# Patient Record
Sex: MALE | URBAN - METROPOLITAN AREA
[De-identification: names, ages, dates, MRNs, and addresses within clinical notes are randomized per-mention and may not be internally consistent; named-entity substitution may affect disease eponyms.]

---

## 2023-01-06 ENCOUNTER — APPOINTMENT (RX ONLY)
Dept: URBAN - METROPOLITAN AREA CLINIC 4 | Facility: CLINIC | Age: 38
Setting detail: DERMATOLOGY
End: 2023-01-06

## 2023-01-06 DIAGNOSIS — L40.0 PSORIASIS VULGARIS: ICD-10-CM

## 2023-01-06 DIAGNOSIS — L85.3 XEROSIS CUTIS: ICD-10-CM

## 2023-01-06 PROBLEM — L30.9 DERMATITIS, UNSPECIFIED: Status: ACTIVE | Noted: 2023-01-06

## 2023-01-06 PROCEDURE — ? COUNSELING

## 2023-01-06 PROCEDURE — 11102 TANGNTL BX SKIN SINGLE LES: CPT

## 2023-01-06 PROCEDURE — 96372 THER/PROPH/DIAG INJ SC/IM: CPT | Mod: 59

## 2023-01-06 PROCEDURE — ? ADDITIONAL NOTES

## 2023-01-06 PROCEDURE — ? TALTZ INJECTION

## 2023-01-06 PROCEDURE — 99204 OFFICE O/P NEW MOD 45 MIN: CPT | Mod: 25

## 2023-01-06 PROCEDURE — 11103 TANGNTL BX SKIN EA SEP/ADDL: CPT

## 2023-01-06 PROCEDURE — ? BIOPSY BY SHAVE METHOD

## 2023-01-06 PROCEDURE — ? PHOTO-DOCUMENTATION

## 2023-01-06 PROCEDURE — ? ORDER TESTS

## 2023-01-06 PROCEDURE — ? PRESCRIPTION

## 2023-01-06 PROCEDURE — ? TREATMENT REGIMEN

## 2023-01-06 RX ORDER — CLOBETASOL PROPIONATE 0.5 MG/G
1 CREAM TOPICAL BID
Qty: 60 | Refills: 2 | Status: ERX | COMMUNITY
Start: 2023-01-06

## 2023-01-06 RX ADMIN — CLOBETASOL PROPIONATE 1: 0.5 CREAM TOPICAL at 00:00

## 2023-01-06 ASSESSMENT — BSA PSORIASIS: % BODY COVERED IN PSORIASIS: 50

## 2023-01-06 ASSESSMENT — LOCATION SIMPLE DESCRIPTION DERM
LOCATION SIMPLE: RIGHT FOREARM
LOCATION SIMPLE: LEFT FOREARM
LOCATION SIMPLE: RIGHT UPPER BACK
LOCATION SIMPLE: ABDOMEN

## 2023-01-06 ASSESSMENT — LOCATION DETAILED DESCRIPTION DERM
LOCATION DETAILED: PERIUMBILICAL SKIN
LOCATION DETAILED: LEFT PROXIMAL DORSAL FOREARM
LOCATION DETAILED: RIGHT INFERIOR UPPER BACK
LOCATION DETAILED: RIGHT PROXIMAL DORSAL FOREARM

## 2023-01-06 ASSESSMENT — LOCATION ZONE DERM
LOCATION ZONE: ARM
LOCATION ZONE: TRUNK

## 2023-01-06 NOTE — PROCEDURE: ADDITIONAL NOTES
Detail Level: Detailed
Additional Notes: Dr. Clifford has seen the patient and agrees with assessment and plan.
Render Risk Assessment In Note?: no

## 2023-01-06 NOTE — PROCEDURE: TALTZ INJECTION
72874 Billing Preferences: 1
Expiration Date (Optional): 2024-APR-17, 2024-JUL-20
Taltz Amount: 160 mg
Was The Medication Purchased By The Clinic?: No
Ndc (80 Mg/Ml Syringe): [address]-25
Render If Medication Purchased By Clinic In Visit Note?: Yes
Detail Level: None
Consent: The risks of pain and injection site reactions were reviewed with the patient prior to the injection.
Ndc (80 Mg/Ml Pen): [address]-66
Syringe Size Used (Required For Enhanced Ndc): 80 mg/ml Prefilled Pen
J-Code: 
Administered By (Optional): Anna Quiñones
Lot # (Optional): [id number], [id number]

## 2023-01-06 NOTE — PROCEDURE: TREATMENT REGIMEN
Action 2: Continue
Initiate Regimen: Triamcinolone cream bid 2 weeks / month throughout the body\\nClobetasol ointment bid 2 weeks / month on flares elbows & back
Detail Level: Zone
Plan: Patient mentions rash flared around the 1st of November. Patient mentions he does have some joint pain in his ankles. Patient has been using antihistamines, Triamcinolone, calcipotriene, and hydroxyzine with no improvement. Recommended UV therapy or Skyrizi / Taltz , will start patient on UV light therapy and send patient to Dr. Clifford to start process of Mimi.

## 2023-01-31 ENCOUNTER — APPOINTMENT (RX ONLY)
Dept: URBAN - METROPOLITAN AREA CLINIC 6 | Facility: CLINIC | Age: 38
Setting detail: DERMATOLOGY
End: 2023-01-31

## 2023-01-31 DIAGNOSIS — L40.0 PSORIASIS VULGARIS: ICD-10-CM

## 2023-01-31 PROCEDURE — ? TALTZ INJECTION

## 2023-01-31 PROCEDURE — 96372 THER/PROPH/DIAG INJ SC/IM: CPT

## 2023-01-31 ASSESSMENT — LOCATION SIMPLE DESCRIPTION DERM: LOCATION SIMPLE: ABDOMEN

## 2023-01-31 ASSESSMENT — LOCATION ZONE DERM: LOCATION ZONE: TRUNK

## 2023-01-31 ASSESSMENT — LOCATION DETAILED DESCRIPTION DERM: LOCATION DETAILED: PERIUMBILICAL SKIN

## 2023-01-31 NOTE — PROCEDURE: TALTZ INJECTION
29359 Billing Preferences: 1
Expiration Date (Optional): 2024-MAR-07
Taltz Amount: 160 mg
Was The Medication Purchased By The Clinic?: No
Treatment Number (Optional): 2
Ndc (80 Mg/Ml Syringe): [address]-84
Render If Medication Purchased By Clinic In Visit Note?: Yes
Detail Level: None
Consent: The risks of pain and injection site reactions were reviewed with the patient prior to the injection.
Ndc (80 Mg/Ml Pen): [address]-88
Syringe Size Used (Required For Enhanced Ndc): 80 mg/ml Prefilled Pen
J-Code: 
Administered By (Optional): Noah SAEZ MA
Lot # (Optional): [id number]

## 2023-02-14 ENCOUNTER — APPOINTMENT (RX ONLY)
Dept: URBAN - METROPOLITAN AREA CLINIC 4 | Facility: CLINIC | Age: 38
Setting detail: DERMATOLOGY
End: 2023-02-14

## 2023-02-14 DIAGNOSIS — L40.0 PSORIASIS VULGARIS: ICD-10-CM | Status: IMPROVED

## 2023-02-14 PROCEDURE — ? PRESCRIPTION

## 2023-02-14 PROCEDURE — ? SEPARATE AND IDENTIFIABLE DOCUMENTATION

## 2023-02-14 PROCEDURE — ? COUNSELING

## 2023-02-14 PROCEDURE — 96372 THER/PROPH/DIAG INJ SC/IM: CPT

## 2023-02-14 PROCEDURE — ? TALTZ INJECTION

## 2023-02-14 PROCEDURE — 99214 OFFICE O/P EST MOD 30 MIN: CPT | Mod: 25

## 2023-02-14 RX ORDER — TRIAMCINOLONE ACETONIDE 1 MG/G
1 CREAM TOPICAL BID PRN
Qty: 453.6 | Refills: 2 | Status: ERX

## 2023-02-14 ASSESSMENT — LOCATION DETAILED DESCRIPTION DERM
LOCATION DETAILED: RIGHT ELBOW
LOCATION DETAILED: LEFT PROXIMAL DORSAL FOREARM
LOCATION DETAILED: PERIUMBILICAL SKIN
LOCATION DETAILED: LEFT ELBOW
LOCATION DETAILED: RIGHT PROXIMAL PRETIBIAL REGION
LOCATION DETAILED: RIGHT PROXIMAL DORSAL FOREARM
LOCATION DETAILED: LEFT PROXIMAL PRETIBIAL REGION

## 2023-02-14 ASSESSMENT — LOCATION ZONE DERM
LOCATION ZONE: LEG
LOCATION ZONE: ARM
LOCATION ZONE: TRUNK

## 2023-02-14 ASSESSMENT — LOCATION SIMPLE DESCRIPTION DERM
LOCATION SIMPLE: LEFT PRETIBIAL REGION
LOCATION SIMPLE: LEFT ELBOW
LOCATION SIMPLE: RIGHT FOREARM
LOCATION SIMPLE: RIGHT PRETIBIAL REGION
LOCATION SIMPLE: LEFT FOREARM
LOCATION SIMPLE: ABDOMEN
LOCATION SIMPLE: RIGHT ELBOW

## 2023-02-14 NOTE — PROCEDURE: TALTZ INJECTION
42855 Billing Preferences: 1
Expiration Date (Optional): 2024-JUL-20
Taltz Amount: 160 mg
Was The Medication Purchased By The Clinic?: No
Treatment Number (Optional): 3
Ndc (80 Mg/Ml Syringe): [address]-99
Render If Medication Purchased By Clinic In Visit Note?: Yes
Detail Level: None
Consent: The risks of pain and injection site reactions were reviewed with the patient prior to the injection.
Ndc (80 Mg/Ml Pen): [address]-33
Syringe Size Used (Required For Enhanced Ndc): 80 mg/ml Prefilled Pen
J-Code: 
Administered By (Optional): Noah SAEZ MA
Lot # (Optional): [id number]

## 2023-04-11 ENCOUNTER — APPOINTMENT (RX ONLY)
Dept: URBAN - METROPOLITAN AREA CLINIC 4 | Facility: CLINIC | Age: 38
Setting detail: DERMATOLOGY
End: 2023-04-11

## 2023-04-11 DIAGNOSIS — L40.0 PSORIASIS VULGARIS: ICD-10-CM

## 2023-04-11 DIAGNOSIS — L40.59 OTHER PSORIATIC ARTHROPATHY: ICD-10-CM | Status: IMPROVED

## 2023-04-11 PROCEDURE — ? COUNSELING

## 2023-04-11 PROCEDURE — ? PRESCRIPTION

## 2023-04-11 PROCEDURE — 99213 OFFICE O/P EST LOW 20 MIN: CPT | Mod: 25

## 2023-04-11 PROCEDURE — 96372 THER/PROPH/DIAG INJ SC/IM: CPT

## 2023-04-11 PROCEDURE — ? TALTZ INJECTION

## 2023-04-11 RX ORDER — CLOBETASOL PROPIONATE 0.5 MG/G
CREAM TOPICAL BID
Qty: 60 | Refills: 3 | Status: ERX | COMMUNITY
Start: 2023-04-11

## 2023-04-11 RX ADMIN — CLOBETASOL PROPIONATE 1: 0.5 CREAM TOPICAL at 00:00

## 2023-04-11 ASSESSMENT — BSA PSORIASIS: % BODY COVERED IN PSORIASIS: 3

## 2023-04-11 ASSESSMENT — LOCATION SIMPLE DESCRIPTION DERM
LOCATION SIMPLE: LEFT ELBOW
LOCATION SIMPLE: ABDOMEN
LOCATION SIMPLE: RIGHT PRETIBIAL REGION
LOCATION SIMPLE: LEFT PRETIBIAL REGION
LOCATION SIMPLE: RIGHT FOREARM

## 2023-04-11 ASSESSMENT — LOCATION DETAILED DESCRIPTION DERM
LOCATION DETAILED: PERIUMBILICAL SKIN
LOCATION DETAILED: LEFT ELBOW
LOCATION DETAILED: RIGHT PROXIMAL DORSAL FOREARM
LOCATION DETAILED: LEFT PROXIMAL PRETIBIAL REGION
LOCATION DETAILED: RIGHT PROXIMAL PRETIBIAL REGION

## 2023-04-11 ASSESSMENT — LOCATION ZONE DERM
LOCATION ZONE: LEG
LOCATION ZONE: ARM
LOCATION ZONE: TRUNK

## 2023-04-11 ASSESSMENT — ITCH NUMERIC RATING SCALE: HOW SEVERE IS YOUR ITCHING?: 10

## 2023-04-11 NOTE — PROCEDURE: TALTZ INJECTION
Was The Medication Purchased By The Clinic?: No
J-Code: 
Treatment Number (Optional): 5
Detail Level: None
Render If Medication Purchased By Clinic In Visit Note?: Yes
Syringe Size Used (Required For Enhanced Ndc): 80 mg/ml Prefilled Pen
Ndc (80 Mg/Ml Pen): [address]-79
Taltz Amount: 80 mg
Ndc (80 Mg/Ml Syringe): [address]-98
79047 Billing Preferences: 1
Consent: The risks of pain and injection site reactions were reviewed with the patient prior to the injection.

## 2023-04-11 NOTE — HPI: PSORIASIS (PATIENT REPORTED)
Do You Have A Family History Of Psoriasis?: yes
Have You Been Diagnosed With Psoriatic Arthritis?: no
Where Is Your Psoriasis Located?: Body through out

## 2023-07-18 ENCOUNTER — APPOINTMENT (RX ONLY)
Dept: URBAN - METROPOLITAN AREA CLINIC 4 | Facility: CLINIC | Age: 38
Setting detail: DERMATOLOGY
End: 2023-07-18

## 2023-07-18 DIAGNOSIS — F10.99 ALCOHOL USE, UNSPECIFIED WITH UNSPECIFIED ALCOHOL-INDUCED DISORDER: ICD-10-CM

## 2023-07-18 DIAGNOSIS — L40.0 PSORIASIS VULGARIS: ICD-10-CM

## 2023-07-18 DIAGNOSIS — L40.59 OTHER PSORIATIC ARTHROPATHY: ICD-10-CM

## 2023-07-18 DIAGNOSIS — B35.1 TINEA UNGUIUM: ICD-10-CM

## 2023-07-18 PROBLEM — L30.9 DERMATITIS, UNSPECIFIED: Status: ACTIVE | Noted: 2023-07-18

## 2023-07-18 PROCEDURE — ? COUNSELING

## 2023-07-18 PROCEDURE — ? ADDITIONAL NOTES

## 2023-07-18 PROCEDURE — ? PRESCRIPTION

## 2023-07-18 PROCEDURE — ? ORDER TESTS

## 2023-07-18 PROCEDURE — 99214 OFFICE O/P EST MOD 30 MIN: CPT

## 2023-07-18 RX ORDER — TERBINAFINE HYDROCHLORIDE 250 MG/1
1 TABLET ORAL QD
Qty: 30 | Refills: 2 | Status: ERX | COMMUNITY
Start: 2023-07-18

## 2023-07-18 RX ADMIN — TERBINAFINE HYDROCHLORIDE 1: 250 TABLET ORAL at 00:00

## 2023-07-18 ASSESSMENT — LOCATION DETAILED DESCRIPTION DERM
LOCATION DETAILED: LEFT GREAT TOE
LOCATION DETAILED: RIGHT GREAT TOE

## 2023-07-18 ASSESSMENT — BSA PSORIASIS: % BODY COVERED IN PSORIASIS: 5

## 2023-07-18 ASSESSMENT — LOCATION ZONE DERM: LOCATION ZONE: TOE

## 2023-07-18 ASSESSMENT — LOCATION SIMPLE DESCRIPTION DERM
LOCATION SIMPLE: RIGHT FOOT
LOCATION SIMPLE: LEFT FOOT

## 2023-07-18 NOTE — PROCEDURE: ADDITIONAL NOTES
Additional Notes: Little to no improvement with 5 Taltz injections. He also uses Clobetasol intermittently on his elbows and lower legs. I discussed a new study for [employer] that we are participating in. He is amenable with this. I referred him to our study team for scheduling. He is starting Terbinafine for fungal infection. I will have him follow up in one month and schedule if he is tolerating anti fungal treatment well.
Detail Level: Simple
Render Risk Assessment In Note?: no

## 2023-09-19 ENCOUNTER — APPOINTMENT (RX ONLY)
Dept: URBAN - METROPOLITAN AREA CLINIC 4 | Facility: CLINIC | Age: 38
Setting detail: DERMATOLOGY
End: 2023-09-19

## 2023-09-19 DIAGNOSIS — L40.59 OTHER PSORIATIC ARTHROPATHY: ICD-10-CM | Status: INADEQUATELY CONTROLLED

## 2023-09-19 DIAGNOSIS — L40.0 PSORIASIS VULGARIS: ICD-10-CM | Status: INADEQUATELY CONTROLLED

## 2023-09-19 PROCEDURE — ? COUNSELING

## 2023-09-19 PROCEDURE — ? DIAGNOSIS COMMENT

## 2023-09-19 PROCEDURE — 99213 OFFICE O/P EST LOW 20 MIN: CPT

## 2023-09-19 PROCEDURE — ? ADDITIONAL NOTES

## 2023-09-19 ASSESSMENT — LOCATION DETAILED DESCRIPTION DERM
LOCATION DETAILED: RIGHT ELBOW
LOCATION DETAILED: RIGHT DISTAL PRETIBIAL REGION
LOCATION DETAILED: LEFT DISTAL PRETIBIAL REGION
LOCATION DETAILED: LEFT ELBOW

## 2023-09-19 ASSESSMENT — LOCATION ZONE DERM
LOCATION ZONE: LEG
LOCATION ZONE: ARM

## 2023-09-19 ASSESSMENT — LOCATION SIMPLE DESCRIPTION DERM
LOCATION SIMPLE: LEFT ELBOW
LOCATION SIMPLE: RIGHT PRETIBIAL REGION
LOCATION SIMPLE: RIGHT ELBOW
LOCATION SIMPLE: LEFT PRETIBIAL REGION

## 2023-09-19 ASSESSMENT — BSA PSORIASIS: % BODY COVERED IN PSORIASIS: 8

## 2023-09-19 NOTE — PROCEDURE: COUNSELING
Detail Level: Simple
Quality 410: Psoriasis Clinical Response To Oral Systemic Or Biologic Medications: Patient has been on biologic or system therapy for less than 6 months
Quality 176: Tuberculosis Screening Prior To First Course Of Biologic And/Or Immune Response Modifier Therapy: Patient receiving first-time biologic and/or immune response modifier therapy, TB Screening Performed and Results Interpreted within 12 months

## 2023-09-19 NOTE — PROCEDURE: DIAGNOSIS COMMENT
Render Risk Assessment In Note?: no
Detail Level: Simple
Comment: 9/19/23 Patient has been off medication for about 2 months. Has been 56 days off alcohol. [facility]

## 2023-09-19 NOTE — PROCEDURE: ADDITIONAL NOTES
Additional Notes: Prior: Little to no improvement with 5 Taltz injections. He also uses Clobetasol intermittently on his elbows and lower legs. I discussed a new study for [employer] that we are participating in. He is amenable with this. I referred him to our study team for scheduling. He is starting Terbinafine for fungal infection. I will have him follow up in one month and schedule if he is tolerating anti fungal treatment well.
Detail Level: Simple
Render Risk Assessment In Note?: no